# Patient Record
Sex: MALE | Race: WHITE | NOT HISPANIC OR LATINO | Employment: STUDENT | URBAN - METROPOLITAN AREA
[De-identification: names, ages, dates, MRNs, and addresses within clinical notes are randomized per-mention and may not be internally consistent; named-entity substitution may affect disease eponyms.]

---

## 2018-01-11 NOTE — RESULT NOTES
Verified Results  (1) HEP B SURFACE ANTIBODY 21Apr2016 02:43PM Sandoval Been     Test Name Result Flag Reference   Hep B Surface Ab, Qual Reactive     Non Reactive: Inconsistent with immunity,                                             less than 10 mIU/mL                               Reactive:     Consistent with immunity,                                             greater than 9 9 mIU/mL  **Verified by repeat analysis**     (1) MUMPS  ANTIBODIES, IGG 21Apr2016 02:43PM Sandoval Been     Test Name Result Flag Reference   Mumps Abs, IgG 60 6 AU/mL  Immune >10 9   Negative         <9 0                                                 Equivocal  9 0 - 10 9                                                 Positive        >10 9                 A positive result generally indicates past exposure to                 Mumps virus or previous vaccination  (1) RUBELLA IMMUNITY 21Apr2016 02:43PM Sandoval Been     Test Name Result Flag Reference   Rubella Antibodies, IgG 1 14 index  Immune >0 99   Non-immune       <0 90                                                 Equivocal  0 90 - 0 99                                                 Immune           >0 99     (1) RUBEOLA ANTIBODIES, IGG 21Apr2016 02:43PM Sandoval Been     Test Name Result Flag Reference   Rubeola Ab, IgG 117 0 AU/mL  Immune >29 9   Negative        <25 0                                                  Equivocal 25 0 - 29 9                                                  Positive        >29 9                 Presence of antibodies to Rubeola is presumptive evidence                 of immunity except when acute infection is suspected       (1) VARICELLA ZOSTER IMMUNITY(IGG) 21Apr2016 02:43PM Sandoval Been     Test Name Result Flag Reference   Varicella Zoster IgG 2983 index  Immune >165   Negative          <135                                                Equivocal    135 - 165                                                Positive          >165 A positive result generally indicates exposure to the                 pathogen or administration of specific immunoglobulins,                 but it is not indication of active infection or stage                 of disease  (LC) HCV Antibody 21Apr2016 02:43PM Kirill Whitfield     Test Name Result Flag Reference   Hep C Virus Ab <0 1 s/co ratio  0 0-0 9   Negative:     < 0 8                                              Indeterminate: 0 8 - 0 9                                                   Positive:     > 0 9                  The CDC recommends that a positive HCV antibody result                  be followed up with a HCV Nucleic Acid Amplification                  test ([id number])         Discussion/Summary   your labs show positive immunity will mail copy of labs or can  for school

## 2018-01-17 NOTE — PROGRESS NOTES
Assessment    1  Well adult exam (V70 0) (Z00 00)    Plan   Allergy To Pollens    · Montelukast Sodium 10 MG Oral Tablet (Singulair); Take 1 tablet daily  Need for Tdap vaccination    · Tdap (Adacel)   · Tubersol 5 UNIT/0 1ML Intradermal Solution  Well adult exam    · Always use a seat belt and shoulder strap when riding or driving a motor vehicle ;  Status:Complete;   Done: 01FSQ6435   · Brush your teeth 3 times a day and floss at least once a day ; Status:Complete;   Done:  12EBE0233   · Decreasing the stress in your life may help your condition improve ; Status:Complete;    Done: 46FMS9114   · Limit your use of alcohol to 2 drinks or cans of beer a day ; Status:Complete;   Done:  07MWE5435   · Schedule an appointment in 48-72 hours to have your TB (tuberculin) skin test  interpreted by a trained healthcare provider ; Status:Complete;   Done: 12YPX0565   · There are ways to decrease your stress and improve your sense of well-being  We  encourage you to keep active and exercise regularly  Make time to take care of yourself  and participate in activities that you enjoy  Stay connected to friends and family that can  support and comfort you  If at any time you have thoughts of harming yourself or  someone else, contact us immediately ; Status:Active; Requested for:62Nfs2182;    · Use a sun block product with an SPF of 15 or more ; Status:Complete;   Done:  45DUS1123   · Using a latex condom can help prevent pregnancy  It can also help to prevent the spread  of sexually transmitted infections ; Status:Complete;   Done: 72UWK8390   · We recommend routine visits to a dentist ; Status:Complete;   Done: 06JDH5005   · Call [phone] if: You have any warning signs of skin cancer ; Status:Complete;    Done: 03PMA2960    Amitiza 24 MCG Oral Capsule; TAKE 1 CAPSULE TWICE DAILY WITH FOOD;    Therapy: 14ZOQ2608 to (Evaluate:53Mgt9841)  Requested for: 32Bib6003; Last Rx:23Mit1740; Status: ACTIVE Ordered  Rx By: Bryan Blanco Kel Ludwin; Dispense: 90 Days ; #:180 Capsule; Refill: 3;   For: Constipation, unspecified constipation type; GIUSEPPE = N; Verified Transmission to [facility] [address]; Msg to Pharmacy: THIS PATIENT NEEDS TO STAY ON [facility]; Last Updated By: System, [employer]; 2/25/2016 5:14:25 PM     Annotations              10/09/2015 REQUEST FROM MOTHER AT HER OFFICE VISIT / LAST APPOINTMENT 07/14/2015 / NEXT APPOINTMENT NOT SCHEDULED / AMD,CMA              Patient's mother called for refill,,last exam 7/14/15,,,no f/u/bg              MOTHER CALLED FOR REFILL    LAST APPT  5/2/15,,,F/U 7/14/15/BG              MOTHER CALLED FOR REFILL,,LAST APPT  5/2/14,,NO F/U/BG              10/29/14 308 msg - rx refill request by pt  last seen 5/2/14  No f/u scheduled  /sg              4/9/14 1212 msg - rx refill request by mother  STATES THAT DR Cindy Driscoll PRESCRIBES WOULD LIKE TO KNOW IF WE COULD MANAGE THIS RX  Last seen 2/17/14  No f/u scheduled  /sg  Follow-up visit in 1 year Evaluation and Treatment  Follow-up  Status: Hold For - Scheduling  Requested for: 81RQQ5526  Ordered; For: Well adult exam;  Ordered By: Sunday Gallego  Performed:   Due: 30RFF2076     Discussion/Summary  Impression: healthy adult male  Currently, he eats a healthy diet and has an adequate exercise regimen  Testicular cancer screening: the risks and benefits of testicular cancer screening were discussed  The immunizations are up to date  Advice and education were given regarding nutrition and aerobic exercise  Patient discussion: discussed with the patient  College physical form completed, follow up for mantoux read, continue current healthy lifestyle, may need medication change due to ins, trial linsess , return for any concerns ,    Possible side effects of new medications were reviewed with the patient/guardian today     The patient was counseled regarding diagnostic results, instructions for management, risk factor reductions, prognosis, patient and family education, impressions, risks and benefits of treatment options, importance of compliance with treatment  Chief Complaint  College physical      History of Present Illness  HM, Adult Male: The patient is being seen for a health maintenance evaluation  General Health: The patient's health since the last visit is described as good  He has regular dental visits  He denies vision problems  Immunizations status: up to date  Lifestyle:  He consumes a diverse and healthy diet  He does not have any weight concerns  He exercises regularly  He does not use tobacco    Screening:   metabolic screening reviewed and updated  risk screening reviewed and updated  HPI: collehe school physical , feels well      Review of Systems    Constitutional: No fever or chills, feels well, no tiredness, no recent weight gain or weight loss  Eyes: No complaints of eye pain, no red eyes, no discharge from eyes, no itchy eyes  ENT: no complaints of earache, no hearing loss, no nosebleeds, no nasal discharge, no sore throat, no hoarseness  Cardiovascular: No complaints of slow heart rate, no fast heart rate, no chest pain, no palpitations, no leg claudication, no lower extremity  Respiratory: No complaints of shortness of breath, no wheezing, no cough, no SOB on exertion, no orthopnea or PND  Gastrointestinal: No complaints of abdominal pain, no constipation, no nausea or vomiting, no diarrhea or bloody stools  Genitourinary: No complaints of dysuria, no incontinence, no hesitancy, no nocturia, no genital lesion, no testicular pain  Musculoskeletal: No complaints of arthralgia, no myalgias, no joint swelling or stiffness, no limb pain or swelling  Integumentary: No complaints of skin rash or skin lesions, no itching, no skin wound, no dry skin     Neurological: No compliants of headache, no confusion, no convulsions, no numbness or tingling, no dizziness or fainting, no limb weakness, no difficulty walking  Psychiatric: Is not suicidal, no sleep disturbances, no anxiety or depression, no change in personality, no emotional problems  Endocrine: No complaints of proptosis, no hot flashes, no muscle weakness, no erectile dysfunction, no deepening of the voice, no feelings of weakness  Hematologic/Lymphatic: No complaints of swollen glands, no swollen glands in the neck, does not bleed easily, no easy bruising  Active Problems    1  Abdomen Tenderness (789 60)   2  Allergy To Pollens (V15 09)   3  Chronic constipation (564 00) (K59 00)   4  Headache (784 0) (R51)   5  Pain in joint of right shoulder region (719 41) (M25 511)   6  Shoulder Sprain (840 9)   7  Strain Of The Left Buttock Gluteus Bogdan (843 8)   8  Stress Fracture Of Shaft Of Femur (733 97)    Past Medical History    · History of Abdominal Pain In Multiple Locations (789 09)   · History of Acute upper respiratory infection (465 9) (J06 9)   · History of Flatus (787 3) (R14 3)   · History of Generalized pain (780 96) (R52)   · History of acne (V13 3) (Z87 2)   · History of allergic rhinitis (V12 69) (Z87 09)   · History of allergic rhinitis (V12 69) (Z87 09)   · History of constipation (V12 79) (Z87 19)   · History of gastroenteritis (V12 79) (Z87 19)   · History of headache (V13 89) (Z87 898)   · Well child visit (V20 2) (Z00 129)    Surgical History    · History of Colonoscopy (Fiberoptic) Screening    Family History    · No pertinent family history    · Family history of Irritable bowel syndrome with constipation    Social History    · Denied: Alcohol Use (History)   · Daily Coffee Consumption (___ Cups/Day)   · Never A Smoker    Current Meds   1  Amitiza 24 MCG Oral Capsule; TAKE 1 CAPSULE TWICE DAILY WITH FOOD; Therapy: 90RUD0333 to ([facility])  Requested for: 78CXD1880; Last   Rx:09Oct2015 Ordered   2  Creatine POWD; USE AS DIRECTED Take 3-5 daily; Therapy: (Recorded:02May2014) to Recorded   3   Montelukast Sodium 10 MG Oral Tablet; Take 1 tablet daily; Therapy: 67NWO3271 to (Rolando Estes)  Requested for: 18DPY1572; Last   Rx:02Jun2015 Ordered    Allergies    1  No Known Drug Allergies    Vitals   Recorded: 93Bsl0161 03:28PM   Temperature 97 9 F   Heart Rate 58   Respiration 16   Systolic 370   Diastolic 70   Height 5 ft 9 in   Weight 152 lb 2 oz   BMI Calculated 22 47   BSA Calculated 1 84   O2 Saturation 99     Physical Exam    Constitutional   General appearance: No acute distress, well appearing and well nourished  Head and Face   Head and face: Normal     Palpation of the face and sinuses: No sinus tenderness  Eyes   Conjunctiva and lids: No erythema, swelling or discharge  Pupils and irises: Equal, round, reactive to light  Ears, Nose, Mouth, and Throat   External inspection of ears and nose: Normal     Otoscopic examination: Tympanic membranes translucent with normal light reflex  Canals patent without erythema  Nasal mucosa, septum, and turbinates: Normal without edema or erythema  Lips, teeth, and gums: Normal, good dentition  Oropharynx: Normal with no erythema, edema, exudate or lesions  Neck   Neck: Supple, symmetric, trachea midline, no masses  Thyroid: Normal, no thyromegaly  Pulmonary   Respiratory effort: No increased work of breathing or signs of respiratory distress  Percussion of chest: Normal     Auscultation of lungs: Clear to auscultation  Cardiovascular   Auscultation of heart: Normal rate and rhythm, normal S1 and S2, no murmurs  Carotid pulses: 2+ bilaterally  Abdominal aorta: Normal     Peripheral vascular exam: Normal     Examination of extremities for edema and/or varicosities: Normal     Abdomen   Abdomen: Non-tender, no masses  Liver and spleen: No hepatomegaly or splenomegaly  Examination for hernias: No hernias appreciated  Lymphatic   Palpation of lymph nodes in neck: No lymphadenopathy      Musculoskeletal   Gait and station: Normal     Inspection/palpation of digits and nails: Normal without clubbing or cyanosis  Inspection/palpation of joints, bones, and muscles: Normal     Range of motion: Normal     Stability: Normal     Muscle strength/tone: Normal     Skin   Skin and subcutaneous tissue: Normal without rashes or lesions  Palpation of skin and subcutaneous tissue: Normal turgor  Neurologic   Cranial nerves: Cranial nerves 2-12 intact  Reflexes: 2+ and symmetric  Sensation: No sensory loss  Psychiatric   Judgment and insight: Normal     Mood and affect: Normal        Procedure    Procedure:   Results: 20/15 in both eyes without corrective device, 20/15 in the right eye without corrective device, 20/20 in the left eye without corrective device      Signatures   Electronically signed by : MIGUEL ANGEL Hannon; Feb 24 2016  6:39PM EST                       (Author)    Electronically signed by :  Ghazala Lang DO; Mar 14 2016  5:54PM EST

## 2020-03-25 ENCOUNTER — TELEMEDICINE (OUTPATIENT)
Dept: FAMILY MEDICINE CLINIC | Facility: CLINIC | Age: 25
End: 2020-03-25
Payer: COMMERCIAL

## 2020-03-25 DIAGNOSIS — Z76.89 ENCOUNTER TO ESTABLISH CARE: Primary | ICD-10-CM

## 2020-03-25 DIAGNOSIS — Q43.8 REDUNDANT COLON: ICD-10-CM

## 2020-03-25 DIAGNOSIS — K59.09 CHRONIC CONSTIPATION: ICD-10-CM

## 2020-03-25 DIAGNOSIS — J30.2 SEASONAL ALLERGIC RHINITIS, UNSPECIFIED TRIGGER: ICD-10-CM

## 2020-03-25 PROCEDURE — 99203 OFFICE O/P NEW LOW 30 MIN: CPT | Performed by: FAMILY MEDICINE

## 2020-03-25 RX ORDER — MONTELUKAST SODIUM 10 MG/1
1 TABLET ORAL DAILY
COMMUNITY
Start: 2014-06-24 | End: 2020-03-25 | Stop reason: SDUPTHER

## 2020-03-25 RX ORDER — MONTELUKAST SODIUM 10 MG/1
10 TABLET ORAL DAILY
Qty: 90 TABLET | Refills: 3 | Status: SHIPPED | OUTPATIENT
Start: 2020-03-25 | End: 2020-12-02 | Stop reason: SDUPTHER

## 2020-03-25 NOTE — PROGRESS NOTES
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   - needed to establish care to get his routine meds RF'd    Seasonal allergic rhinitis, unspecified trigger        Relevant Medications    montelukast (SINGULAIR) 10 mg tablet  - well controlled on Singulair - discussed new report which came out ~2wks ago that Singulair has been known to cause mood disturbances/increase SI in pts and recommended that pt switch to a different agent - pt prefers to cont with Singulair for now            Reason for visit is to establish care and for med RFs  Encounter provider Thad Olszewski, DO    Provider located at [address] [place] [address]      Recent Visits  No visits were found meeting these conditions  Showing recent visits within past 7 days and meeting all other requirements     Today's Visits  Date Type Provider Dept   03/25/20 Telemedicine Elida Umanzor DO [facility]   Showing today's visits and meeting all other requirements     Future Appointments  No visits were found meeting these conditions  Showing future appointments within next 150 days and meeting all other requirements        After connecting through [email or web address], the patient was identified by name and date of birth  Janessa Crouch was informed that this is a telemedicine visit and that the visit is being conducted through [employer] which may not be secure and therefore, might not be HIPAA-compliant  My office door was closed   No one else was in the room   He acknowledged consent and understanding of privacy and security of the video platform  The patient has agreed to participate and understands they can discontinue the visit at any time  Subjective  Cecilia Tijerina is a 21 y o  male who presents virtually to establish care and for medication RFs   - former PCP: in [place], last OV was in 2018   - 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[place] [address] and will be moving back to [place] [address] in June for residency start date on 7/1/2020  - PMHx: chronic constipation/redundant colon and seasonal allergies  - Allergies: NKDA   - Meds: Linzess 145mg QD and Singulair 10mg QD   - PSHx: had a Cscope in  - "redundant colon"   - FHx: F (IBS - constipation), M (diverticulosis - diverticulitis), PGF (MI at 74yo), denies FHx of colon/prostate/testicular ca   - Social: denies tob/EtOH/illicits   - ROS - today pt denies F/C/N/V/CP/palpitations/SOB/wheezing/abd pain  - (+) chronic constipation - needs RF of Linzess  - (+) seasonal allergies - needs RF of Singulair     Past Medical History:   Diagnosis Date    Acne     Allergic rhinitis     Stress fracture of shaft of femur     Last assessed 3/5/2013        Past Surgical History:   Procedure Laterality Date    COLONOSCOPY      Last assessed 7/15/2015        Current Outpatient Medications   Medication Sig Dispense Refill    linaCLOtide (Linzess) 145 MCG CAPS Take 1 capsule (145 mcg total) by mouth daily 90 capsule 3    montelukast (SINGULAIR) 10 mg tablet Take 1 tablet (10 mg total) by mouth daily 90 tablet 3     No current facility-administered medications for this visit           Allergies no known allergies    Review of Systems as per HPI     Physical Exam   General: AAOx3, well developed/nourished   HEENT: NC/AT, EOMI, clear conjuctiva, nml external ear and nose  Cardio: deferred   Pulm: no respiratory distress   Abd: deferred   Psych: nml mood and affect, nml behavior     I spent 15 minutes with the patient during this visit

## 2020-12-02 ENCOUNTER — TELEMEDICINE (OUTPATIENT)
Dept: FAMILY MEDICINE CLINIC | Facility: CLINIC | Age: 25
End: 2020-12-02
Payer: COMMERCIAL

## 2020-12-02 DIAGNOSIS — K59.09 CHRONIC CONSTIPATION: Primary | ICD-10-CM

## 2020-12-02 DIAGNOSIS — Q43.8 REDUNDANT COLON: ICD-10-CM

## 2020-12-02 DIAGNOSIS — J30.2 SEASONAL ALLERGIC RHINITIS, UNSPECIFIED TRIGGER: ICD-10-CM

## 2020-12-02 DIAGNOSIS — L70.0 ACNE VULGARIS: ICD-10-CM

## 2020-12-02 PROCEDURE — 99213 OFFICE O/P EST LOW 20 MIN: CPT | Performed by: FAMILY MEDICINE

## 2020-12-02 PROCEDURE — 1036F TOBACCO NON-USER: CPT | Performed by: FAMILY MEDICINE

## 2020-12-02 RX ORDER — SULFAMETHOXAZOLE AND TRIMETHOPRIM 800; 160 MG/1; MG/1
TABLET ORAL
COMMUNITY
Start: 2020-10-01 | End: 2020-12-02 | Stop reason: SDUPTHER

## 2020-12-02 RX ORDER — LINACLOTIDE 145 UG/1
1 CAPSULE, GELATIN COATED ORAL DAILY
Qty: 90 CAPSULE | Refills: 3 | Status: SHIPPED | OUTPATIENT
Start: 2020-12-02 | End: 2021-02-03

## 2020-12-02 RX ORDER — MONTELUKAST SODIUM 10 MG/1
10 TABLET ORAL DAILY
Qty: 90 TABLET | Refills: 3 | Status: SHIPPED | OUTPATIENT
Start: 2020-12-02 | End: 2021-02-03

## 2020-12-02 RX ORDER — SULFAMETHOXAZOLE AND TRIMETHOPRIM 800; 160 MG/1; MG/1
1 TABLET ORAL EVERY 12 HOURS SCHEDULED
Qty: 60 TABLET | Refills: 0 | Status: SHIPPED | OUTPATIENT
Start: 2020-12-02 | End: 2021-01-01

## 2021-02-01 DIAGNOSIS — J30.2 SEASONAL ALLERGIC RHINITIS, UNSPECIFIED TRIGGER: ICD-10-CM

## 2021-02-01 DIAGNOSIS — K59.09 CHRONIC CONSTIPATION: ICD-10-CM

## 2021-02-03 RX ORDER — LINACLOTIDE 145 UG/1
CAPSULE, GELATIN COATED ORAL
Qty: 90 CAPSULE | Refills: 2 | Status: SHIPPED | OUTPATIENT
Start: 2021-02-03

## 2021-02-03 RX ORDER — MONTELUKAST SODIUM 10 MG/1
TABLET ORAL
Qty: 90 TABLET | Refills: 2 | Status: SHIPPED | OUTPATIENT
Start: 2021-02-03 | End: 2021-09-14

## 2021-09-14 DIAGNOSIS — J30.2 SEASONAL ALLERGIC RHINITIS, UNSPECIFIED TRIGGER: ICD-10-CM

## 2021-09-14 RX ORDER — MONTELUKAST SODIUM 10 MG/1
TABLET ORAL
Qty: 90 TABLET | Refills: 2 | Status: SHIPPED | OUTPATIENT
Start: 2021-09-14